# Patient Record
Sex: FEMALE | Race: WHITE
[De-identification: names, ages, dates, MRNs, and addresses within clinical notes are randomized per-mention and may not be internally consistent; named-entity substitution may affect disease eponyms.]

---

## 2017-01-20 ENCOUNTER — HOSPITAL ENCOUNTER (OUTPATIENT)
Dept: HOSPITAL 25 - OR | Age: 67
Discharge: HOME | End: 2017-01-20
Attending: OTOLARYNGOLOGY
Payer: MEDICARE

## 2017-01-20 DIAGNOSIS — J38.3: Primary | ICD-10-CM

## 2017-01-20 DIAGNOSIS — J38.7: ICD-10-CM

## 2017-01-21 NOTE — OP
DATE OF OPERATION:  01/20/17 - [facility]

 

DATE OF BIRTH:  [birth year]

 

SURGEON:  Bakari Ramos MD



ANESTHESIA:  Local

 

PRE-OP DIAGNOSIS:  Spasmodic dysphonia.

 

POST-OP DIAGNOSIS:

 

OPERATIVE PROCEDURE:  EMG-guided Botox injection of the right true vocal cord 
under local anesthesia.

 

DESCRIPTION OF PROCEDURE:  The patient was done in the local room in same day.  
Her head was extended.  The 

_____ landmarks were demarcated with pen.  Her neck was prepped with alcohol, 
10 units of Botox was loaded into the EMG injection system and under EMG 
guidance, it was inserted into the right vocal cord with the appropriate muscle 
action potential.  I injected the 10 units of Botox.  The patient tolerated the 
procedure, there were no complications.

 

 [id number]/[facility] #: [id number]

DARSHAN

## 2017-05-10 ENCOUNTER — HOSPITAL ENCOUNTER (OUTPATIENT)
Dept: HOSPITAL 25 - OR | Age: 67
Discharge: HOME | End: 2017-05-10
Attending: OTOLARYNGOLOGY
Payer: MEDICARE

## 2017-05-10 VITALS — DIASTOLIC BLOOD PRESSURE: 88 MMHG | SYSTOLIC BLOOD PRESSURE: 149 MMHG

## 2017-05-10 DIAGNOSIS — J38.3: ICD-10-CM

## 2017-05-10 DIAGNOSIS — R49.0: Primary | ICD-10-CM

## 2017-05-10 PROCEDURE — 95873 GUIDE NERV DESTR ELEC STIM: CPT

## 2017-05-10 PROCEDURE — 64616 CHEMODENERV MUSC NECK DYSTON: CPT

## 2017-05-10 NOTE — OP
DATE OF OPERATION:  05/10/17 - [facility]

 

DATE OF BIRTH:  [birth year]

 

SURGEON:  Benja Ramos MD



ANESTHESIA:  No anesthesia used.

 

PRE-OP DIAGNOSIS:  Spasmodic dysphonia.

 

POST-OP DIAGNOSIS:  Spasmodic dysphonia.

 

OPERATIVE PROCEDURE:  EMG-guided Botox injection to the vocal cords.

 

DESCRIPTION OF PROCEDURE:  The patient was taken to the Local room.  The head 
was extended.  Her cricoid cartilage and injection sites were demarcated and 
wiped with alcohol.  Botox was loaded into the EMG Botox needle, and inserted 
through the skin and the cricothyroid membrane into the left vocal cord.  I 
injected 5 units of Botox, confirming muscle action potentials with phonation 
of "E"; and then on the right side I injected 10 units of Botox for a total of 
15 units.  Therefore, 85 units of Botox were wasted.  The patient tolerated the 
procedure well.  There were no complications.

 

 [id number]/[facility] #: [id number]

MTDD

## 2017-10-11 ENCOUNTER — HOSPITAL ENCOUNTER (OUTPATIENT)
Dept: HOSPITAL 25 - OR | Age: 67
Discharge: HOME | End: 2017-10-11
Attending: OTOLARYNGOLOGY
Payer: MEDICARE

## 2017-10-11 DIAGNOSIS — J38.3: Primary | ICD-10-CM

## 2017-10-11 DIAGNOSIS — Z79.84: ICD-10-CM

## 2017-10-11 DIAGNOSIS — E11.9: ICD-10-CM

## 2017-10-11 DIAGNOSIS — J38.7: ICD-10-CM

## 2017-10-11 NOTE — OP
DATE OF OPERATION:  10/11/17 - [facility]

 

DATE OF BIRTH:  [birth year].

 

SURGEON:  Bakari Ramos MD.

 

ANESTHESIA:  No anesthesia was used.

 

PRE-OP DIAGNOSIS:  Spasmodic dysphonia.

 

POST-OP DIAGNOSIS:  Spasmodic dysphonia.

 

OPERATIVE PROCEDURE:  EMG-guided Botox injection into the right vocal cord with 
10 units of Botox, and into her left vocal cord with 5 units of Botox.

 

DESCRIPTION OF PROCEDURE:  The patient was in the local room and her head was 
hyperextended.  Her cricoid cartilage and injection sites were marked.  The 
Botox was loaded into the EMG injection needle and connected to the EMG 
machine.  The injection was done transcutaneous transcricothyroid membrane.  A 
needle was inserted supero-laterally into initially the right vocal cord with 
proper EMG potentials.  10 units of Botox were injected.  I then repeated this 
into the left vocal cord where 5 units of Botox were injected.  The patient 
tolerated the procedure well.

 

 [id number]/[facility] #: [id number]

MTDD

## 2018-05-09 ENCOUNTER — HOSPITAL ENCOUNTER (OUTPATIENT)
Dept: HOSPITAL 25 - OR | Age: 68
Discharge: HOME | End: 2018-05-09
Attending: OTOLARYNGOLOGY
Payer: MEDICARE

## 2018-05-09 DIAGNOSIS — J38.7: ICD-10-CM

## 2018-05-09 DIAGNOSIS — Z79.84: ICD-10-CM

## 2018-05-09 DIAGNOSIS — J38.3: Primary | ICD-10-CM

## 2018-05-09 DIAGNOSIS — E11.9: ICD-10-CM

## 2018-05-09 PROCEDURE — 95873 GUIDE NERV DESTR ELEC STIM: CPT

## 2018-05-09 PROCEDURE — 64616 CHEMODENERV MUSC NECK DYSTON: CPT

## 2018-05-10 NOTE — OP
DATE OF OPERATION:  05/09/18 - [facility]

 

DATE OF BIRTH:  [birth year]

 

SURGEON:  Bakari Ramos M.D.

 

ANESTHESIA:  Local.

 

PRE-OP DIAGNOSIS:  Spasmodic dysphonia.

 

POST-OP DIAGNOSIS:  Spasmodic dysphonia.

 

OPERATIVE PROCEDURE:  EMG-guided Botox injections into the patient's vocal cords
, left and right, under local anesthesia.

 

DESCRIPTION OF PROCEDURE:  The patient was in the local room.  Her head was 
extended and skin was prepped with alcohol.  Her cricoid cartilage midline were 
demarcated with the pen and then injected with 1% lidocaine with 1:100,000 
epinephrine over her cricothyroid membrane.  Using the EMG-guided Botox needle, 
I inserted the needle through the skin, cricothyroid muscle superolaterally 
into the vocal cord with the proper EMG muscle potentials.  I injected 10 units 

of Botox into her right vocal cord and 5 units of Botox into her left vocal 
cord.  The patient tolerated this well.  No complications.

 

 [id number]/[facility] #: [id number]

MTDD

## 2018-09-12 ENCOUNTER — HOSPITAL ENCOUNTER (OUTPATIENT)
Dept: HOSPITAL 25 - OR | Age: 68
Discharge: HOME | End: 2018-09-12
Attending: OTOLARYNGOLOGY
Payer: MEDICARE

## 2018-09-12 VITALS — DIASTOLIC BLOOD PRESSURE: 67 MMHG | SYSTOLIC BLOOD PRESSURE: 121 MMHG

## 2018-09-12 DIAGNOSIS — J38.3: Primary | ICD-10-CM

## 2018-09-12 DIAGNOSIS — J38.7: ICD-10-CM

## 2018-09-12 PROCEDURE — 64616 CHEMODENERV MUSC NECK DYSTON: CPT

## 2018-09-12 PROCEDURE — 95873 GUIDE NERV DESTR ELEC STIM: CPT

## 2018-09-12 NOTE — OP
DATE OF OPERATION:  09/12/18 - [facility]

 

DATE OF BIRTH:  [birth year]

 

SURGEON:  Bakari Ramos MD

 

PRE-OP DIAGNOSIS:  Spasmodic dysphonia.

 

POST-OP DIAGNOSIS:  Spasmodic dysphonia.

 

OPERATIVE PROCEDURE:  EMG-guided Botox injection with 10 units of Botox in the 
right vocal cord and 5 units of Botox in the left vocal cord using EMG guidance.

 

DESCRIPTION OF PROCEDURE:  The patient's neck was prepped with some alcohol.  
Her cricothyroid membrane was demarcated and injection sites demarcated.  Using 
EMG guidance, the Botox injection needle was inserted through the skin, through 
the cricothyroid membrane superolaterally into the respective vocal cord.  
Proper EMG potentials were heard, and the Botox was injected.  Again, initially 
10 units in the right vocal cord and then 5 units in the left vocal cord.  The 
patient tolerated this procedure well, no complications.

 

 [id number]/[facility] #: [id number]

MTDD

## 2019-04-19 ENCOUNTER — HOSPITAL ENCOUNTER (OUTPATIENT)
Dept: HOSPITAL 25 - OR | Age: 69
Discharge: HOME | End: 2019-04-19
Attending: OTOLARYNGOLOGY
Payer: MEDICARE

## 2019-04-19 DIAGNOSIS — J38.3: Primary | ICD-10-CM

## 2019-04-19 PROCEDURE — 64616 CHEMODENERV MUSC NECK DYSTON: CPT

## 2019-04-19 PROCEDURE — 95873 GUIDE NERV DESTR ELEC STIM: CPT

## 2019-04-19 NOTE — PRO
PROCEDURE NOTE:

 

DATE OF PROCEDURE:  04/19/19

 

PROCEDURE:  EMG-guided Botox injection into her vocal cords with 10 units of Botox into the right voc
al cord and 5 units of Botox into the left vocal cord.

 

ANESTHESIA:  No anesthesia was used.

 

DIAGNOSIS:  Spasmodic dysphonia.

 

DESCRIPTION OF PROCEDURE:  The patient was in the procedure room with EMG machine with the ground and
 return electrodes on her.  The Botox was loaded into the EMG injection needle, inserted to the skin 
of the cricothyroid membrane on the right side superolaterally into the right vocal cord.  Good EMG m
uscle potentials and 10 units of Botox was injected.  Unfortunately, as I was doing this, she swallow
ed, I am not sure whether I got it all into the vocal cord.  On the left side, I reinserted the needl
e superolaterally into the left vocal cord, listened to the EMG potentials, and injected 5 units of B
otox.  The patient tolerated the procedure well with no complications, although I do not know about t
he right injection.  She has been instructed to call if she does not get the desired effect.

 

 [id number]/[facility] #: [id number]

## 2019-10-04 ENCOUNTER — HOSPITAL ENCOUNTER (OUTPATIENT)
Dept: HOSPITAL 25 - OR | Age: 69
Discharge: HOME | End: 2019-10-04
Attending: OTOLARYNGOLOGY
Payer: MEDICARE

## 2019-10-04 DIAGNOSIS — J38.7: ICD-10-CM

## 2019-10-04 DIAGNOSIS — R49.0: ICD-10-CM

## 2019-10-04 DIAGNOSIS — J38.3: Primary | ICD-10-CM

## 2019-10-04 PROCEDURE — 95873 GUIDE NERV DESTR ELEC STIM: CPT

## 2019-10-04 PROCEDURE — 64616 CHEMODENERV MUSC NECK DYSTON: CPT

## 2019-10-04 NOTE — OP
DATE OF OPERATION:  10/04/19 - [facility]

 

DATE OF BIRTH:  [birth year]

 

SURGEON:  Benja Ramos MD

 

PRE-OP DIAGNOSIS:  Spasmodic dysphonia.

 

PRE-OP DIAGNOSIS:  Spasmodic dysphonia.

 

ANESTHESIA:  No anesthesia.

 

OPERATIVE PROCEDURE:  EMG-guided Botox injection into true vocal cords.

 

DESCRIPTION OF PROCEDURE:  This was performed in the local room in same day 
surgery.

 

The patient has a history of spasmodic dysphonia and she presents today for 
another EMG-guided Botox injection.  Her head was hyperextended.  The EMG 
reference and return electrodes were placed.  The Botox was loaded into the EMG 
injection needle.  Initially, the right vocal cord was injected.  It was 
inserted through the skin into the cricothyroid membrane, superolaterally into 
the right true vocal cord, where 10 units of Botox were injected when I had the 
proper EMG muscle potentials. The needle was then placed towards the left and 5 
units of Botox were injected into the left vocal cord again under EMG guidance.
  The patient tolerated this well.

 

 [id number]/[facility] #: [id number]

[facility] Center[facility]Y